# Patient Record
Sex: FEMALE | URBAN - METROPOLITAN AREA
[De-identification: names, ages, dates, MRNs, and addresses within clinical notes are randomized per-mention and may not be internally consistent; named-entity substitution may affect disease eponyms.]

---
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
## 2023-12-31 ENCOUNTER — HOSPITAL ENCOUNTER (EMERGENCY)
Age: 23
Discharge: HOME | End: 2023-12-31
Payer: COMMERCIAL

## 2023-12-31 VITALS
RESPIRATION RATE: 20 BRPM | OXYGEN SATURATION: 100 % | TEMPERATURE: 98.3 F | DIASTOLIC BLOOD PRESSURE: 87 MMHG | HEART RATE: 80 BPM | SYSTOLIC BLOOD PRESSURE: 120 MMHG

## 2023-12-31 DIAGNOSIS — J20.9 ACUTE BRONCHITIS, UNSPECIFIED ORGANISM: Primary | ICD-10-CM

## 2023-12-31 LAB
FLUAV AG SPEC QL: NEGATIVE
FLUBV AG SPEC QL: NEGATIVE
SARS-COV-2 RDRP RESP QL NAA+PROBE: NOT  DETECTED

## 2023-12-31 PROCEDURE — 99213 OFFICE O/P EST LOW 20 MIN: CPT

## 2023-12-31 PROCEDURE — 99203 OFFICE O/P NEW LOW 30 MIN: CPT | Performed by: NURSE PRACTITIONER

## 2023-12-31 PROCEDURE — 87635 SARS-COV-2 COVID-19 AMP PRB: CPT

## 2023-12-31 PROCEDURE — 87804 INFLUENZA ASSAY W/OPTIC: CPT

## 2023-12-31 RX ORDER — PSEUDOEPHEDRINE HYDROCHLORIDE 60 MG/1
60 TABLET, FILM COATED ORAL EVERY 6 HOURS PRN
Qty: 28 TABLET | Refills: 0 | Status: SHIPPED | OUTPATIENT
Start: 2023-12-31

## 2023-12-31 RX ORDER — LORATADINE 10 MG/1
10 TABLET ORAL DAILY
Qty: 10 TABLET | Refills: 0 | Status: SHIPPED | OUTPATIENT
Start: 2023-12-31 | End: 2024-01-30

## 2023-12-31 RX ORDER — PREDNISONE 20 MG/1
40 TABLET ORAL DAILY
Qty: 10 TABLET | Refills: 0 | Status: SHIPPED | OUTPATIENT
Start: 2023-12-31 | End: 2024-01-05

## 2023-12-31 NOTE — ED PROVIDER NOTES
[facility] URGENT CARE  UrgentCare Encounter      CHIEFCOMPLAINT       Chief Complaint   Patient presents with    Nasal Congestion    Headache    Cough       Nurses Notes reviewed and I agree except as noted in the HPI.  HISTORY OF PRESENT ILLNESS   Gaetano Lau is a 23 y.o. female who presents to urgent care with complaints of nasal congestion, headache, cough.  Symptom onset was approximately 2 days ago.  She denies any fever, body aches, chills.    REVIEW OF SYSTEMS     Review of Systems   HENT:  Positive for congestion.    Respiratory:  Positive for cough.        PAST MEDICAL HISTORY   History reviewed. No pertinent past medical history.    SURGICAL HISTORY     Patient  has no past surgical history on file.    CURRENT MEDICATIONS       Discharge Medication List as of 12/31/2023  3:24 PM          ALLERGIES     Patient is has No Known Allergies.    FAMILY HISTORY     Patient'sfamily history is not on file.    SOCIAL HISTORY     Patient      PHYSICAL EXAM     ED TRIAGE VITALS  BP: 120/87, Temp: 98.3 °F (36.8 °C), Pulse: 80, Respirations: 20, SpO2: 100 %  Physical Exam  Vitals and nursing note reviewed.   Constitutional:       General: She is not in acute distress.     Appearance: Normal appearance. She is not ill-appearing or toxic-appearing.   HENT:      Head: Normocephalic and atraumatic.      Nose: Congestion present. No rhinorrhea.      Mouth/Throat:      Mouth: Mucous membranes are moist.      Pharynx: Oropharynx is clear.   Eyes:      Extraocular Movements: Extraocular movements intact.      Conjunctiva/sclera: Conjunctivae normal.      Pupils: Pupils are equal, round, and reactive to light.   Cardiovascular:      Rate and Rhythm: Normal rate and regular rhythm.      Pulses: Normal pulses.      Heart sounds: Normal heart sounds. No murmur heard.  Pulmonary:      Effort: Pulmonary effort is normal. No respiratory distress.      Breath sounds: Normal breath sounds. No wheezing.   Abdominal:       General: Abdomen is flat.      Palpations: Abdomen is soft.      Tenderness: There is no abdominal tenderness.   Musculoskeletal:         General: No swelling or deformity. Normal range of motion.      Cervical back: Normal range of motion and neck supple.   Skin:     General: Skin is warm and dry.      Capillary Refill: Capillary refill takes less than 2 seconds.      Findings: No rash.   Neurological:      General: No focal deficit present.      Mental Status: She is alert and oriented to person, place, and time. Mental status is at baseline.   Psychiatric:         Mood and Affect: Mood normal.         Behavior: Behavior normal.         Thought Content: Thought content normal.         Judgment: Judgment normal.         DIAGNOSTIC RESULTS   Labs:  Results for orders placed or performed during the hospital encounter of 12/31/23   Rapid influenza A/B antigens    Specimen: Nasopharyngeal   Result Value Ref Range    Flu A Antigen Negative NEGATIVE    Flu B Antigen Negative NEGATIVE   COVID-19, Rapid   Result Value Ref Range    SARS-CoV-2, STELLA NOT  DETECTED NOT DETECTED       IMAGING:  No orders to display     URGENT CARE COURSE:         Medications - No data to display  PROCEDURES:  FINALIMPRESSION      1. Acute bronchitis, unspecified organism        DISPOSITION/PLAN   DISPOSITION Decision To Discharge 12/31/2023 03:22:02 PM  URGENT CARE COURSE:   Patient is nontoxic-appearing.  Flu and COVID are negative.  Will start on pseudoephedrine, prednisone, loratadine.  Recommend close follow-up with primary care provider.  Encourage oral fluid hydration.  Patient denies questions      PATIENT REFERRED TO:  Cony Fonseca APRN - NP  [address] [facility] [address] [phone]          DISCHARGE MEDICATIONS:  Discharge Medication List as of 12/31/2023  3:24 PM        START taking these medications    Details   pseudoephedrine (SUDAFED) 60 MG tablet Take 1 tablet by mouth every 6 hours as needed for Congestion, Disp-28

## 2024-01-11 ENCOUNTER — HOSPITAL ENCOUNTER (EMERGENCY)
Age: 24
Discharge: HOME | End: 2024-01-11
Payer: COMMERCIAL

## 2024-01-11 VITALS
WEIGHT: 205 LBS | HEIGHT: 67 IN | DIASTOLIC BLOOD PRESSURE: 74 MMHG | HEART RATE: 83 BPM | TEMPERATURE: 97.3 F | BODY MASS INDEX: 32.18 KG/M2 | OXYGEN SATURATION: 97 % | RESPIRATION RATE: 18 BRPM | SYSTOLIC BLOOD PRESSURE: 127 MMHG

## 2024-01-11 DIAGNOSIS — J20.9 ACUTE BRONCHITIS, UNSPECIFIED ORGANISM: Primary | ICD-10-CM

## 2024-01-11 PROCEDURE — 99214 OFFICE O/P EST MOD 30 MIN: CPT | Performed by: NURSE PRACTITIONER

## 2024-01-11 PROCEDURE — 99213 OFFICE O/P EST LOW 20 MIN: CPT

## 2024-01-11 RX ORDER — ALBUTEROL SULFATE 90 UG/1
2 AEROSOL, METERED RESPIRATORY (INHALATION) EVERY 6 HOURS PRN
Qty: 1 EACH | Refills: 0 | Status: SHIPPED | OUTPATIENT
Start: 2024-01-11

## 2024-01-11 RX ORDER — BENZONATATE 100 MG/1
100 CAPSULE ORAL 3 TIMES DAILY PRN
Qty: 20 CAPSULE | Refills: 0 | Status: SHIPPED | OUTPATIENT
Start: 2024-01-11 | End: 2024-01-18

## 2024-01-11 RX ORDER — AZITHROMYCIN 250 MG/1
TABLET, FILM COATED ORAL
Qty: 1 PACKET | Refills: 0 | Status: SHIPPED | OUTPATIENT
Start: 2024-01-11 | End: 2024-01-15

## 2024-01-11 ASSESSMENT — ENCOUNTER SYMPTOMS
EYE REDNESS: 0
TROUBLE SWALLOWING: 0
VOMITING: 0
DIARRHEA: 0
SHORTNESS OF BREATH: 0
RHINORRHEA: 0
SORE THROAT: 0
NAUSEA: 0
CHEST TIGHTNESS: 1
COUGH: 1
EYE DISCHARGE: 0

## 2024-01-11 ASSESSMENT — LIFESTYLE VARIABLES
HOW MANY STANDARD DRINKS CONTAINING ALCOHOL DO YOU HAVE ON A TYPICAL DAY: PATIENT DOES NOT DRINK
HOW OFTEN DO YOU HAVE A DRINK CONTAINING ALCOHOL: NEVER

## 2024-01-11 ASSESSMENT — PAIN - FUNCTIONAL ASSESSMENT: PAIN_FUNCTIONAL_ASSESSMENT: NONE - DENIES PAIN

## 2024-01-11 NOTE — ED TRIAGE NOTES
Patient walks into [facility] with c/o cough lasting one week.  This cough is non-productive and dry.  Patient was into the UC approximately 2 weeks ago and received a medication for the cough.  States this did not help.  Patient currently smokes marijuana daily.  Denies any other concerns.  Patient is not short of breath or in any distress.  She has not been sleeping well at night d/t the cough.

## 2024-01-11 NOTE — ED PROVIDER NOTES
range of motion and neck supple.   Lymphadenopathy:      Cervical: No cervical adenopathy.   Skin:     General: Skin is warm and dry.      Capillary Refill: Capillary refill takes less than 2 seconds.   Neurological:      General: No focal deficit present.      Mental Status: She is alert and oriented to person, place, and time.   Psychiatric:         Mood and Affect: Mood normal.         Behavior: Behavior normal.         Thought Content: Thought content normal.         Judgment: Judgment normal.         DIAGNOSTIC RESULTS   Labs:No results found for this visit on 01/11/24.    IMAGING:  No orders to display      URGENT CARE COURSE:     Vitals:    01/11/24 1422   BP: 127/74   Pulse: 83   Resp: 18   Temp: 97.3 °F (36.3 °C)   SpO2: 97%   Weight: 93 kg (205 lb)   Height: 1.702 m (5' 7\")       Medications - No data to display  PROCEDURES:  None  FINAL IMPRESSION      1. Acute bronchitis, unspecified organism        DISPOSITION/PLAN   DISPOSITION Decision To Discharge 01/11/2024 02:36:45 PM    The results of pertinent diagnostic studies and exam findings were discussed with patient/patient representative.   Shared decision-making was performed and patient/patient representative are agreeable that they are suitable for discharge at this time.  The patient’s provisional diagnosis and plan of care were discussed with the patient/patient representative who expressed understanding.  The patient/representative is given strict written and verbal instructions about care at home, including over-the-counter management, prescription information, follow-up, and signs and symptoms of worsening of condition, and the patient/patient representative did verbalize understanding of these instructions.  Patient will go to nearest emergency department if symptoms change or worsen, or for any sign or symptom deemed emergent by the patient or family members. Follow up as an outpatient with PCP within the next 3 days, or sooner if symptoms  warrant.     PATIENT REFERRED TO:  Zari Cerda APRN - CNP  [address] [facility] [address] [phone]    In 1 week  If symptoms worsen    DISCHARGE MEDICATIONS:  New Prescriptions    ALBUTEROL SULFATE HFA (PROVENTIL HFA) 108 (90 BASE) MCG/ACT INHALER    Inhale 2 puffs into the lungs every 6 hours as needed for Wheezing    AZITHROMYCIN (ZITHROMAX Z-BETTIE) 250 MG TABLET    Take 2 tablets (500 mg) on Day 1, and then take 1 tablet (250 mg) on days 2 through 5.    BENZONATATE (TESSALON) 100 MG CAPSULE    Take 1 capsule by mouth 3 times daily as needed for Cough     Current Discharge Medication List        Please note that some or all of this chart was generated using [employer] voice recognition software. Although every effort was made to ensure the accuracy of this automated transcription, some errors in transcription may have occurred    TONY Cervantes CNP, Kendra R, APRN - CNP  01/11/24 1683

## 2024-03-09 ENCOUNTER — HOSPITAL ENCOUNTER (EMERGENCY)
Age: 24
Discharge: HOME | End: 2024-03-09
Payer: COMMERCIAL

## 2024-03-09 VITALS
SYSTOLIC BLOOD PRESSURE: 114 MMHG | HEART RATE: 94 BPM | BODY MASS INDEX: 31.39 KG/M2 | OXYGEN SATURATION: 98 % | HEIGHT: 67 IN | RESPIRATION RATE: 18 BRPM | TEMPERATURE: 98 F | WEIGHT: 200 LBS | DIASTOLIC BLOOD PRESSURE: 77 MMHG

## 2024-03-09 DIAGNOSIS — J06.9 VIRAL URI: Primary | ICD-10-CM

## 2024-03-09 LAB
FLUAV AG SPEC QL: NEGATIVE
FLUBV AG SPEC QL: NEGATIVE
S PYO AG THROAT QL: NEGATIVE

## 2024-03-09 PROCEDURE — 99213 OFFICE O/P EST LOW 20 MIN: CPT

## 2024-03-09 PROCEDURE — 87804 INFLUENZA ASSAY W/OPTIC: CPT

## 2024-03-09 PROCEDURE — 87651 STREP A DNA AMP PROBE: CPT

## 2024-03-09 RX ORDER — PSEUDOEPHEDRINE HYDROCHLORIDE 60 MG/1
30 TABLET, FILM COATED ORAL EVERY 4 HOURS PRN
Qty: 24 TABLET | Refills: 0 | Status: SHIPPED | OUTPATIENT
Start: 2024-03-09

## 2024-03-09 RX ORDER — FLUTICASONE PROPIONATE 50 MCG
2 SPRAY, SUSPENSION (ML) NASAL DAILY
Qty: 16 G | Refills: 0 | Status: SHIPPED | OUTPATIENT
Start: 2024-03-09

## 2024-03-09 ASSESSMENT — PAIN SCALES - GENERAL: PAINLEVEL_OUTOF10: 2

## 2024-03-09 ASSESSMENT — ENCOUNTER SYMPTOMS: SORE THROAT: 1

## 2024-03-09 ASSESSMENT — PAIN DESCRIPTION - LOCATION: LOCATION: HEAD

## 2024-03-09 ASSESSMENT — PAIN - FUNCTIONAL ASSESSMENT: PAIN_FUNCTIONAL_ASSESSMENT: 0-10

## 2024-03-09 NOTE — ED PROVIDER NOTES
[facility] URGENT CARE  Urgent Care Encounter       CHIEF COMPLAINT       Chief Complaint   Patient presents with    Nasal Congestion    Pharyngitis       Nurses Notes reviewed and I agree except as noted in the HPI.  HISTORY OF PRESENT ILLNESS   Gaetano Lau is a 23 y.o. female who presents with complaints of congestion and sore throat that started at work yesterday. Reports other people at work have similar symptoms. Pt denies taking any medication for symptoms.     The history is provided by the patient.       REVIEW OF SYSTEMS     Review of Systems   Constitutional:  Negative for fatigue and fever.   HENT:  Positive for congestion and sore throat.    Neurological:  Positive for headaches.   All other systems reviewed and are negative.      PAST MEDICAL HISTORY   No past medical history on file.    SURGICALHISTORY     Patient  has no past surgical history on file.    CURRENT MEDICATIONS       Discharge Medication List as of 3/9/2024  6:52 PM        CONTINUE these medications which have NOT CHANGED    Details   albuterol sulfate HFA (PROVENTIL HFA) 108 (90 Base) MCG/ACT inhaler Inhale 2 puffs into the lungs every 6 hours as needed for Wheezing, Disp-1 each, R-0Normal             ALLERGIES     Patient is has No Known Allergies.    Patients   There is no immunization history on file for this patient.    FAMILY HISTORY     Patient's family history is not on file.    SOCIAL HISTORY     Patient  reports that she has never smoked. She has never used smokeless tobacco. She reports current drug use. Frequency: 7.00 times per week. Drug: Marijuana (Weed). She reports that she does not drink alcohol.    PHYSICAL EXAM     ED TRIAGE VITALS  BP: 114/77, Temp: 98 °F (36.7 °C), Pulse: 94, Respirations: 18, SpO2: 98 %,Estimated body mass index is 31.32 kg/m² as calculated from the following:    Height as of this encounter: 1.702 m (5' 7\").    Weight as of this encounter: 90.7 kg (200 lb).,Patient's last

## 2024-03-09 NOTE — ED NOTES
Pt presents to the urgent care with complaints of nasal congestion, HA, and pharyngitis. Pt reports she work at [employer] and everyone at work is sick as well. Pt has congested cough. Pt respirations are even and unlabored.     Oli Yancey, RN  03/09/24 3200

## 2024-04-26 ENCOUNTER — HOSPITAL ENCOUNTER (EMERGENCY)
Age: 24
Discharge: HOME | End: 2024-04-26
Payer: COMMERCIAL

## 2024-04-26 VITALS
HEART RATE: 90 BPM | SYSTOLIC BLOOD PRESSURE: 128 MMHG | OXYGEN SATURATION: 100 % | DIASTOLIC BLOOD PRESSURE: 60 MMHG | RESPIRATION RATE: 20 BRPM | TEMPERATURE: 98.2 F

## 2024-04-26 DIAGNOSIS — J06.9 VIRAL URI WITH COUGH: Primary | ICD-10-CM

## 2024-04-26 PROCEDURE — 99213 OFFICE O/P EST LOW 20 MIN: CPT

## 2024-04-26 PROCEDURE — 99213 OFFICE O/P EST LOW 20 MIN: CPT | Performed by: NURSE PRACTITIONER

## 2024-04-26 RX ORDER — BROMPHENIRAMINE MALEATE, PSEUDOEPHEDRINE HYDROCHLORIDE, AND DEXTROMETHORPHAN HYDROBROMIDE 2; 30; 10 MG/5ML; MG/5ML; MG/5ML
10 SYRUP ORAL 4 TIMES DAILY PRN
Qty: 118 ML | Refills: 0 | Status: SHIPPED | OUTPATIENT
Start: 2024-04-26

## 2024-04-26 ASSESSMENT — ENCOUNTER SYMPTOMS
NAUSEA: 0
SHORTNESS OF BREATH: 0
COUGH: 1
DIARRHEA: 0
SINUS PRESSURE: 0
VOMITING: 0
ABDOMINAL PAIN: 0
SORE THROAT: 0

## 2024-04-26 NOTE — ED PROVIDER NOTES
[facility] URGENT CARE  UrgentCare Encounter      CHIEFCOMPLAINT       Chief Complaint   Patient presents with    Cough    Congestion       Nurses Notes reviewed and I agree except as noted in the HPI.  HISTORY OF PRESENT ILLNESS     Gaetano Lau is a 23 y.o. female who presents to the urgent care for evaluation.    Cough and congestion started 3 days ago.   No medications taken for symptoms since onset.     The patient/patient representative has no other acute complaints at this time.    REVIEW OF SYSTEMS     Review of Systems   Constitutional:  Negative for chills, fatigue and fever.   HENT:  Positive for congestion. Negative for ear pain, sinus pressure and sore throat.    Respiratory:  Positive for cough. Negative for shortness of breath.    Cardiovascular:  Negative for chest pain.   Gastrointestinal:  Negative for abdominal pain, diarrhea, nausea and vomiting.   Skin:  Negative for rash.   Allergic/Immunologic: Negative for environmental allergies and food allergies.   Neurological:  Negative for headaches.   Hematological:  Negative for adenopathy.       PAST MEDICAL HISTORY   History reviewed. No pertinent past medical history.    SURGICAL HISTORY     Patient  has no past surgical history on file.    CURRENT MEDICATIONS       Discharge Medication List as of 4/26/2024  9:50 AM          ALLERGIES     Patient is has No Known Allergies.    FAMILY HISTORY     Patient'sfamily history is not on file.    SOCIAL HISTORY     Patient  reports that she has never smoked. She has never used smokeless tobacco. She reports current drug use. Frequency: 7.00 times per week. Drug: Marijuana (Weed). She reports that she does not drink alcohol.    PHYSICAL EXAM     ED TRIAGE VITALS  BP: 128/60, Temp: 98.2 °F (36.8 °C), Pulse: 90, Respirations: 20, SpO2: 100 %  Physical Exam  Vitals and nursing note reviewed.   Constitutional:       General: She is not in acute distress.     Appearance: Normal appearance. She is

## 2024-06-05 ENCOUNTER — HOSPITAL ENCOUNTER (EMERGENCY)
Age: 24
Discharge: HOME | End: 2024-06-05
Payer: COMMERCIAL

## 2024-06-05 VITALS
RESPIRATION RATE: 16 BRPM | OXYGEN SATURATION: 98 % | HEIGHT: 67 IN | BODY MASS INDEX: 30.54 KG/M2 | DIASTOLIC BLOOD PRESSURE: 81 MMHG | SYSTOLIC BLOOD PRESSURE: 119 MMHG | WEIGHT: 194.6 LBS | HEART RATE: 86 BPM | TEMPERATURE: 96.8 F

## 2024-06-05 DIAGNOSIS — J06.9 VIRAL URI WITH COUGH: Primary | ICD-10-CM

## 2024-06-05 LAB — S PYO AG THROAT QL: NEGATIVE

## 2024-06-05 PROCEDURE — 99213 OFFICE O/P EST LOW 20 MIN: CPT | Performed by: NURSE PRACTITIONER

## 2024-06-05 PROCEDURE — 87651 STREP A DNA AMP PROBE: CPT

## 2024-06-05 PROCEDURE — 99213 OFFICE O/P EST LOW 20 MIN: CPT

## 2024-06-05 RX ORDER — DEXTROMETHORPHAN HBR. AND GUAIFENESIN 10; 100 MG/5ML; MG/5ML
10 SOLUTION ORAL EVERY 4 HOURS PRN
Qty: 180 ML | Refills: 0 | Status: SHIPPED | OUTPATIENT
Start: 2024-06-05

## 2024-06-05 RX ORDER — LORATADINE PSEUDOEPHEDRINE SULFATE 10; 240 MG/1; MG/1
1 TABLET, EXTENDED RELEASE ORAL DAILY
Qty: 15 TABLET | Refills: 0 | Status: SHIPPED | OUTPATIENT
Start: 2024-06-05

## 2024-06-05 RX ORDER — FLUTICASONE PROPIONATE 50 MCG
2 SPRAY, SUSPENSION (ML) NASAL DAILY
Qty: 1 EACH | Refills: 1 | Status: SHIPPED | OUTPATIENT
Start: 2024-06-05

## 2024-06-05 RX ORDER — ACETAMINOPHEN 325 MG/1
650 TABLET ORAL EVERY 6 HOURS PRN
COMMUNITY

## 2024-06-05 ASSESSMENT — PAIN SCALES - GENERAL: PAINLEVEL_OUTOF10: 6

## 2024-06-05 ASSESSMENT — PAIN DESCRIPTION - DESCRIPTORS: DESCRIPTORS: ACHING

## 2024-06-05 ASSESSMENT — PAIN - FUNCTIONAL ASSESSMENT
PAIN_FUNCTIONAL_ASSESSMENT: PREVENTS OR INTERFERES SOME ACTIVE ACTIVITIES AND ADLS
PAIN_FUNCTIONAL_ASSESSMENT: 0-10

## 2024-06-05 ASSESSMENT — PAIN DESCRIPTION - LOCATION: LOCATION: THROAT

## 2024-06-05 ASSESSMENT — PAIN DESCRIPTION - PAIN TYPE: TYPE: ACUTE PAIN

## 2024-06-05 ASSESSMENT — ENCOUNTER SYMPTOMS
COUGH: 1
RHINORRHEA: 1

## 2024-06-05 NOTE — DISCHARGE INSTRUCTIONS
Your strep test is negative.    Medications as prescribed.    Please help with your primary care provider as needed.

## 2024-06-05 NOTE — ED PROVIDER NOTES
rate and regular rhythm.      Pulses: Normal pulses.      Heart sounds: Normal heart sounds. No murmur heard.  Pulmonary:      Effort: Pulmonary effort is normal. No respiratory distress.      Breath sounds: Normal breath sounds. No wheezing.   Abdominal:      General: Abdomen is flat.      Palpations: Abdomen is soft.      Tenderness: There is no abdominal tenderness.   Musculoskeletal:         General: No swelling or deformity. Normal range of motion.      Cervical back: Normal range of motion and neck supple.   Skin:     General: Skin is warm and dry.      Capillary Refill: Capillary refill takes less than 2 seconds.      Findings: No rash.   Neurological:      General: No focal deficit present.      Mental Status: She is alert and oriented to person, place, and time. Mental status is at baseline.   Psychiatric:         Mood and Affect: Mood normal.         Behavior: Behavior normal.         Thought Content: Thought content normal.         Judgment: Judgment normal.         DIAGNOSTIC RESULTS   Labs:  Results for orders placed or performed during the hospital encounter of 06/05/24   Strep Screen Group A Throat   Result Value Ref Range    Rapid Strep A Screen NEGATIVE        IMAGING:  No orders to display     URGENT CARE COURSE:         Medications - No data to display  PROCEDURES:  FINALIMPRESSION      1. Viral URI with cough        DISPOSITION/PLAN   DISPOSITION Decision To Discharge 06/05/2024 12:30:13 PM    Patient is nontoxic-appearing.  Findings consistent with viral upper respiratory infection.  Will start on loratadine with pseudoephedrine, Flonase, Robitussin DM.  Rest.  Increase oral fluid intake.  Follow-up with primary care provider as needed.    PATIENT REFERRED TO:  Cony Fonseca APRN - NP  [address] [facility] [address] [phone]          DISCHARGE MEDICATIONS:  New Prescriptions    DEXTROMETHORPHAN-GUAIFENESIN (ROBITUSSIN-DM)  MG/5ML SYRUP    Take 10 mLs by mouth every 4 hours as

## 2024-06-05 NOTE — ED TRIAGE NOTES
Patient ambulated to room with complaint of sore throat and nasal congestion that started yesterday.

## 2025-02-12 ENCOUNTER — HOSPITAL ENCOUNTER (EMERGENCY)
Age: 25
Discharge: HOME | End: 2025-02-12
Payer: COMMERCIAL

## 2025-02-12 VITALS
OXYGEN SATURATION: 96 % | SYSTOLIC BLOOD PRESSURE: 102 MMHG | HEART RATE: 88 BPM | RESPIRATION RATE: 16 BRPM | DIASTOLIC BLOOD PRESSURE: 59 MMHG | TEMPERATURE: 97.1 F

## 2025-02-12 DIAGNOSIS — Z20.828 EXPOSURE TO INFLUENZA: ICD-10-CM

## 2025-02-12 DIAGNOSIS — R19.7 NAUSEA VOMITING AND DIARRHEA: ICD-10-CM

## 2025-02-12 DIAGNOSIS — J06.9 UPPER RESPIRATORY INFECTION WITH COUGH AND CONGESTION: Primary | ICD-10-CM

## 2025-02-12 DIAGNOSIS — R11.2 NAUSEA VOMITING AND DIARRHEA: ICD-10-CM

## 2025-02-12 PROCEDURE — 99213 OFFICE O/P EST LOW 20 MIN: CPT

## 2025-02-12 RX ORDER — ONDANSETRON 4 MG/1
4 TABLET, ORALLY DISINTEGRATING ORAL 3 TIMES DAILY PRN
Qty: 21 TABLET | Refills: 0 | Status: SHIPPED | OUTPATIENT
Start: 2025-02-12

## 2025-02-12 RX ORDER — BROMPHENIRAMINE MALEATE, PSEUDOEPHEDRINE HYDROCHLORIDE, AND DEXTROMETHORPHAN HYDROBROMIDE 2; 30; 10 MG/5ML; MG/5ML; MG/5ML
10 SYRUP ORAL 4 TIMES DAILY PRN
Qty: 118 ML | Refills: 0 | Status: SHIPPED | OUTPATIENT
Start: 2025-02-12

## 2025-02-12 NOTE — ED NOTES
To [facility] with complaints of cough, congesetion, diarrhea, vomiting body aches since friday     Claudia Oneill, PORTIA  02/12/25 9441

## 2025-02-12 NOTE — DISCHARGE INSTRUCTIONS
Your symptoms and assessment are highly indicative of a viral upper respiratory infection.      Please hydrate well keeping urine clear/pale yellow and get plenty of rest, this is the best way to decrease severity and expedite resolution of viral symptoms.      Please practice good hand hygiene to prevent spread of your illness, minimize interactions with others.    You can use Zofran on as-needed basis for nausea/vomiting.  You can use Bromfed on a as needed basis for cough/congestion.    Okay to alternate Tylenol/Motrin every 3 hours to treat fever/body aches.  For example, Tylenol at noon, Motrin at 3 PM and then Tylenol again at 6 PM…    Okay to return to work/school function as long as you are fever free without the use of Tylenol/Motrin for 24 hours and your symptoms are overall improving.    See your family doctor if symptoms fail to improve or sooner if they worsen, return to ER/urgent care for any other urgent/emergent medical concerns.    Hope you are feeling better soon!

## 2025-02-12 NOTE — ED PROVIDER NOTES
[facility] URGENT CARE      URGENT CARE     Pt Name: Gaetano Lau  MRN: [id number]  Birthdate 2000  Date of evaluation: 2/12/2025  Provider: TONY Mondragon - CNP    Urgent Care Encounter     CHIEF COMPLAINT       Chief Complaint   Patient presents with    Cough    Congestion    Vomiting    Diarrhea    Generalized Body Aches     HISTORY OF PRESENT ILLNESS   Gaetano Lau is a 24 y.o. female who presents to urgent care chief complaint of cough/chest congestion and runny nose, body aches.  Symptoms started Friday, unfortunately nonbloody emesis/diarrhea x 1 today.  Accompanied by girlfriend who has symptoms similar to this and was diagnosed with influenza.  She denies taking anything for symptoms.  Denies checking temperature but admits to chills.  Denies chest pain or shortness of breath.    History obtained from patient    PAST MEDICAL HISTORY   History reviewed. No pertinent past medical history.  SURGICALHISTORY     Patient  has no past surgical history on file.  CURRENT MEDICATIONS       Previous Medications    ACETAMINOPHEN (TYLENOL) 325 MG TABLET    Take 2 tablets by mouth every 6 hours as needed for Pain    DEXTROMETHORPHAN-GUAIFENESIN (ROBITUSSIN-DM)  MG/5ML SYRUP    Take 10 mLs by mouth every 4 hours as needed for Cough    FLUTICASONE (FLONASE) 50 MCG/ACT NASAL SPRAY    2 sprays by Each Nostril route daily    LORATADINE-PSEUDOEPHEDRINE (CLARITIN-D 24 HOUR)  MG PER EXTENDED RELEASE TABLET    Take 1 tablet by mouth daily     ALLERGIES     Patient is has No Known Allergies.  Patients   There is no immunization history on file for this patient.  FAMILY HISTORY     Patient's family history is not on file.  SOCIAL HISTORY     Patient  reports that she has never smoked. She has never used smokeless tobacco. She reports current drug use. Frequency: 7.00 times per week. Drug: Marijuana (Weed). She reports that she does not drink alcohol.  PHYSICAL EXAM     ED TRIAGE VITALS  BP: